# Patient Record
Sex: MALE | Race: WHITE | Employment: FULL TIME | URBAN - METROPOLITAN AREA
[De-identification: names, ages, dates, MRNs, and addresses within clinical notes are randomized per-mention and may not be internally consistent; named-entity substitution may affect disease eponyms.]

---

## 2022-10-25 ENCOUNTER — HOSPITAL ENCOUNTER (EMERGENCY)
Age: 57
Discharge: HOME | End: 2022-10-25
Attending: EMERGENCY MEDICINE
Payer: COMMERCIAL

## 2022-10-25 ENCOUNTER — HOSPITAL ENCOUNTER (EMERGENCY)
Dept: CT IMAGING | Age: 57
Discharge: HOME | End: 2022-10-28
Payer: COMMERCIAL

## 2022-10-25 VITALS
SYSTOLIC BLOOD PRESSURE: 116 MMHG | HEART RATE: 61 BPM | BODY MASS INDEX: 37.89 KG/M2 | OXYGEN SATURATION: 97 % | RESPIRATION RATE: 16 BRPM | DIASTOLIC BLOOD PRESSURE: 73 MMHG | TEMPERATURE: 97.7 F | WEIGHT: 250 LBS | HEIGHT: 68 IN

## 2022-10-25 DIAGNOSIS — N28.1 RENAL CYST: Primary | ICD-10-CM

## 2022-10-25 PROCEDURE — 74176 CT ABD & PELVIS W/O CONTRAST: CPT

## 2022-10-25 PROCEDURE — 99284 EMERGENCY DEPT VISIT MOD MDM: CPT

## 2022-10-25 RX ORDER — ASPIRIN 81 MG/1
81 TABLET ORAL DAILY
COMMUNITY

## 2022-10-25 RX ORDER — LISINOPRIL AND HYDROCHLOROTHIAZIDE 12.5; 1 MG/1; MG/1
1 TABLET ORAL DAILY
COMMUNITY

## 2022-10-25 RX ORDER — TRAMADOL HYDROCHLORIDE 50 MG/1
50 TABLET ORAL EVERY 6 HOURS PRN
Qty: 12 TABLET | Refills: 0 | Status: SHIPPED | OUTPATIENT
Start: 2022-10-25 | End: 2022-10-28

## 2022-10-25 RX ORDER — ATORVASTATIN CALCIUM 20 MG/1
20 TABLET, FILM COATED ORAL DAILY
COMMUNITY

## 2022-10-25 ASSESSMENT — PAIN - FUNCTIONAL ASSESSMENT: PAIN_FUNCTIONAL_ASSESSMENT: 0-10

## 2022-10-25 ASSESSMENT — ENCOUNTER SYMPTOMS
ABDOMINAL PAIN: 1
BLOOD IN STOOL: 0
VOMITING: 0
NAUSEA: 0
ANAL BLEEDING: 0
CONSTIPATION: 0
DIARRHEA: 0

## 2022-10-25 ASSESSMENT — PAIN SCALES - GENERAL: PAINLEVEL_OUTOF10: 9

## 2022-10-25 ASSESSMENT — PAIN DESCRIPTION - ORIENTATION: ORIENTATION: LEFT;LOWER

## 2022-10-25 ASSESSMENT — PAIN DESCRIPTION - LOCATION: LOCATION: FLANK;ABDOMEN

## 2022-10-25 NOTE — ED TRIAGE NOTES
Pt c/o intermittent left flank pain that started three weeks ago, testicle pain started last week and LLQ pain that started today. Pt states he was nausea yesterday. Pt denies vomiting. Pt denies history of kidney stones.  Pt states he went to urgent care and was told to come to the ED for CT scan

## 2022-10-25 NOTE — Clinical Note
Melissa Stewart was seen and treated in our emergency department on 10/25/2022. He may return to work on 10/27/2022. If you have any questions or concerns, please don't hesitate to call.       Patricia Butt, DO

## 2022-10-25 NOTE — Clinical Note
Maureen Valdovinos was seen and treated in our emergency department on 10/25/2022. He may return to work on 10/27/2022. If you have any questions or concerns, please don't hesitate to call.       Kate Campbell, DO

## 2022-10-26 NOTE — CARE COORDINATION
Pt called in asking for a referral to [facility], for he has a appointment scheduled. Referral sent in [facility]. No further needs at this time.

## 2022-10-26 NOTE — ED NOTES
I have reviewed discharge instructions with the patient. The patient verbalized understanding. Patient left ED via Discharge Method: ambulatory to Home with spouse. Opportunity for questions and clarification provided. Patient given 1 scripts. To continue your aftercare when you leave the hospital, you may receive an automated call from our care team to check in on how you are doing. This is a free service and part of our promise to provide the best care and service to meet your aftercare needs.  If you have questions, or wish to unsubscribe from this service please call [phone]. Thank you for Choosing our [address] [place] [address] Emergency Department.         Yoav Rosa RN  10/25/22 3870

## 2022-10-26 NOTE — ED PROVIDER NOTES
Emergency Department Provider Note                   PCP:                Luh Woodard MD               Age: 65 y.o. Sex: male     No diagnosis found. DISPOSITION          MDM  Number of Diagnoses or Management Options  Diagnosis management comments: CT findings are negative for renal stones however there is a large left renal cyst as well as some prominent adenopathy per radiology interpretation. Abnormalities of the bilateral adrenal glands also noted. Patient will be referred to urology for follow-up. Amount and/or Complexity of Data Reviewed  Tests in the radiology section of CPT®: ordered and reviewed  Independent visualization of images, tracings, or specimens: yes    Risk of Complications, Morbidity, and/or Mortality  Presenting problems: low  Diagnostic procedures: low  Management options: low    Patient Progress  Patient progress: stable             Orders Placed This Encounter   Procedures    CT ABDOMEN PELVIS RENAL STONE        Medications - No data to display    New Prescriptions    No medications on file        Joyce Massey is a 65 y.o. male who presents to the Emergency Department with chief complaint of    Chief Complaint   Patient presents with    Flank Pain    Testicle Pain      Patient referred to ER from urgent care for CT to evaluate for possible renal colic. Lab test results (CBC, U/A) done at urgent care reviewed and are unremarkable. Patient reports pain in left flank starting about 3 weeks ago that has progressed to left testicle and LLQ abdominal pain. No hematuria, dysuria, nausea/vomiting reported. Currently pain is 3/10 after receiving analgesics at urgent care. Denies swelling of testicle or mass. The history is provided by the patient. Review of Systems   Constitutional:  Negative for chills and fever. Gastrointestinal:  Positive for abdominal pain. Negative for anal bleeding, blood in stool, constipation, diarrhea, nausea and vomiting. Genitourinary:  Positive for testicular pain. Negative for dysuria, hematuria and urgency. Musculoskeletal:  Negative for myalgias. Neurological:  Negative for numbness. All other systems reviewed and are negative. Past Medical History:   Diagnosis Date    Hyperlipidemia     Hypertension         History reviewed. No pertinent surgical history. History reviewed. No pertinent family history. Social History     Socioeconomic History    Marital status: Single     Spouse name: None    Number of children: None    Years of education: None    Highest education level: None   Tobacco Use    Smoking status: Never    Smokeless tobacco: Never   Substance and Sexual Activity    Alcohol use: Not Currently    Drug use: Not Currently         Patient has no known allergies. Previous Medications    ASPIRIN 81 MG EC TABLET    Take 81 mg by mouth daily    ATORVASTATIN (LIPITOR) 20 MG TABLET    Take 20 mg by mouth daily    LISINOPRIL-HYDROCHLOROTHIAZIDE (PRINZIDE;ZESTORETIC) 10-12.5 MG PER TABLET    Take 1 tablet by mouth daily        Vitals signs and nursing note reviewed. Patient Vitals for the past 4 hrs:   Temp Pulse Resp BP SpO2   10/25/22 1836 -- -- -- 116/73 --   10/25/22 1834 97.7 °F (36.5 °C) 61 16 (!) 179/136 97 %          Physical Exam  Vitals and nursing note reviewed. Constitutional:       General: He is not in acute distress. Appearance: Normal appearance. He is not ill-appearing, toxic-appearing or diaphoretic. HENT:      Head: Normocephalic and atraumatic. Eyes:      Extraocular Movements: Extraocular movements intact. Conjunctiva/sclera: Conjunctivae normal.      Pupils: Pupils are equal, round, and reactive to light. Cardiovascular:      Rate and Rhythm: Normal rate. Pulmonary:      Effort: Pulmonary effort is normal.   Abdominal:      General: There is no distension. Palpations: Abdomen is soft. Tenderness: There is no abdominal tenderness.  There is no right CVA tenderness, left CVA tenderness or guarding. Genitourinary:     Comments: Deferred (done at urgent care)  Musculoskeletal:         General: Normal range of motion. Cervical back: Normal range of motion. Skin:     General: Skin is warm. Capillary Refill: Capillary refill takes less than 2 seconds. Neurological:      General: No focal deficit present. Mental Status: He is alert and oriented to person, place, and time. Mental status is at baseline. Psychiatric:         Mood and Affect: Mood normal.         Behavior: Behavior normal.         Thought Content: Thought content normal.        Procedures    Results for orders placed or performed during the hospital encounter of 10/25/22   CT ABDOMEN PELVIS RENAL STONE    Narrative    EXAM: CT ABDOMEN PELVIS RENAL STONE    HISTORY: Intermittent left flank pain, testicular pain, left lower quadrant  pain. TECHNIQUE: Axial images of the abdomen and pelvis were performed without  intravenous contrast. Images were obtained in the axial plane and coronal  reformatted images were created and reviewed. Dose reduction technique used: Automated exposure control/Adjustment of the mA  and/or kV according to patient size/Use of iterative reconstruction technique. COMPARISON: None. FINDINGS:   Visualized lung bases and mediastinum are unremarkable. The visualized liver, spleen, pancreas and gallbladder, are within normal  limits. The right adrenal gland contains an 8.1 mm fat density lesion. The left adrenal  gland contains a similar fat density lesion which measures 3.4 x 3.1 cm. The  left adrenal gland also contains a focal calcification. The right kidney is unremarkable. The bladder is partially distended and appears  grossly normal.    The left kidney contains a 6.5 x 6.5 cm, exophytic, fluid density lesion in the  lower pole. There is no renal or ureterolithiasis and there is no  hydronephrosis.     Distal esophagus and stomach are unremarkable. Small and large bowel show no  evidence of obstruction. Low-density fluid is noted throughout the small bowel. Appendix is not visualized. There is no evidence of localized inflammatory  process at the level of the cecum. Multiple prominent mesenteric lymph nodes are noted particularly in the left  abdomen. No evidence of free fluid or free air. No abdominal aortic aneurysm. Osseous structures show no evidence of acute fracture or suspicious lesion. Impression    1. No renal ureteral or bladder lithiasis. There is no hydronephrosis. There is  a 6.5 cm exophytic lesion present in the lower pole of the left kidney. Its  Hounsfield units are compatible with a cyst.    2. Both adrenal glands contain fat density lesions as described above. 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 3. The left adrenal gland contains a coarse calcification suggesting prior  hemorrhage. 4. There are multiple prominent mesenteric lymph nodes appreciated particularly  in the left abdomen. The significance is unclear. Short follow-up of these nodes  is recommended to ensure stability or resolution, and to rule out a developing  neoplastic process. CT ABDOMEN PELVIS RENAL STONE   Final Result   1. No renal ureteral or bladder lithiasis. There is no hydronephrosis. There is   a 6.5 cm exophytic lesion present in the lower pole of the left kidney. Its   Hounsfield units are compatible with a cyst.      2. Both adrenal glands contain fat density lesions as described above. 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 3. The left adrenal gland contains a coarse calcification suggesting prior   hemorrhage. 4. There are multiple prominent mesenteric lymph nodes appreciated particularly   in the left abdomen. The significance is unclear. Short follow-up of these nodes   is recommended to ensure stability or resolution, and to rule out a developing   neoplastic process.                           Voice dictation software was used during the making of this note. This software is not perfect and grammatical and other typographical errors may be present. This note has not been completely proofread for errors.      Tracey Enriqeuz, DO  10/25/22 2100       Tracey Enriquez, DO  10/25/22 4519

## 2022-11-01 NOTE — PROGRESS NOTES
[facility]  [address] [place] [address]  [phone]          Donald Murguia  : 1965      INITIAL EVALUATION    Chief Complaint   Patient presents with    New Patient       HPI: Donald Murguia is a 65 y.o. male with renal mass. Patient is a pleasant 71-year-old male who currently lives in [place].  He presents today with a left renal mass. He presented to [facility] emergency department on 10/25/2022 with left-sided lower back and flank pain. He underwent a CT of the abdomen pelvis without contrast which showed a 6.5 cm exophytic lesion on the lower portion of the left kidney. It was compatible with a simple cyst.  He also had bilateral adrenal myelo lipomas. The left one measured 3.4 cm. He was also found to have prominent mesenteric lymphadenopathy. His creatinine on 2021 was 1.17. Of note he saw a [facility] urologist back in  and was diagnosed with a complex left renal cyst that measured approximately 5 cm. It was thought to be a Bosniak 1 or Bosniak 2 cyst and no additional treatment was needed. He also had significant mesenteric adenopathy as well as an adrenal mass measuring 3 cm on the left side that was consistent with a myelo lipoma. He underwent a biopsy of his mesenteric lymphadenopathy in 2019 that was negative for lymphoma or any type of malignancy. The patient is a bit unclear on his past medical history and follow-ups. He denies any gross hematuria. He is currently not having any flank or abdominal pain. PMH:     Past Medical History:   Diagnosis Date    Hyperlipidemia     Hypertension        PSH:    History reviewed. No pertinent surgical history.     MEDs:    Current Outpatient Medications   Medication Sig Dispense Refill    docusate sodium (COLACE) 50 MG capsule Take by mouth      fluticasone (FLONASE) 50 MCG/ACT nasal spray 1 spray by Nasal route daily      guaiFENesin (MUCINEX) 600 MG extended release tablet Take by mouth      loratadine (CLARITIN) 10 MG tablet Take by mouth      naproxen (NAPROSYN) 500 MG tablet Take 500 mg by mouth in the morning and 500 mg in the evening. traMADol (ULTRAM) 50 MG tablet Take 50 mg by mouth every 6 hours as needed. nitroGLYCERIN (NITROSTAT) 0.4 MG SL tablet Place 0.4 mg under the tongue every 5 minutes as needed      aspirin 81 MG EC tablet Take 81 mg by mouth daily      atorvastatin (LIPITOR) 20 MG tablet Take 20 mg by mouth daily      lisinopril-hydroCHLOROthiazide (PRINZIDE;ZESTORETIC) 10-12.5 MG per tablet Take 1 tablet by mouth daily       No current facility-administered medications for this visit. ALLERGIES:     No Known Allergies    FH:     History reviewed. No pertinent family history. SH:     Social History     Socioeconomic History    Marital status: Single     Spouse name: Not on file    Number of children: Not on file    Years of education: Not on file    Highest education level: Not on file   Occupational History    Not on file   Tobacco Use    Smoking status: Never    Smokeless tobacco: Never   Substance and Sexual Activity    Alcohol use: Not Currently    Drug use: Not Currently    Sexual activity: Not on file   Other Topics Concern    Not on file   Social History Narrative    Not on file     Social Determinants of Health     Financial Resource Strain: Not on file   Food Insecurity: Not on file   Transportation Needs: Not on file   Physical Activity: Not on file   Stress: Not on file   Social Connections: Not on file   Intimate Partner Violence: Not on file   Housing Stability: Not on file       ROS:   Review of Systems   Constitutional: Negative. Negative for chills, fatigue and fever. Respiratory: Negative. Cardiovascular: Negative. Gastrointestinal: Negative. Genitourinary: Negative. Negative for difficulty urinating, dysuria, flank pain, frequency, hematuria and urgency.    Musculoskeletal: Negative. All other systems reviewed and are negative. PHYSICAL EXAM  GENERAL: Well-groomed, well-nourished, pleasant 65 y.o. male, in no acute distress. /69 (Site: Right Upper Arm, Position: Sitting, Cuff Size: Medium Adult)   Pulse 53   Temp 97.8 °F (36.6 °C) (Oral)   Resp 16   Ht 5' 8.11\" (1.73 m)   Wt 212 lb 12.8 oz (96.5 kg)   SpO2 99%   BMI 32.25 kg/m²   General: well dressed, well nourished, no acute distress  Skin: no rashes  HEENT: Sclera are clear,normocephalic, atraumatic. no external lesions  Cardiovascular: Reg. Normal perfusion  Respiratory: normal respiratory effort, no JVD, no audible wheezing. Musculoskeletal: unremarkable with normal function. No embolic signs or cyanosis. Neurologic exam: intact, no focal deficits, moves all 4 extremities  Psych: normal mood and affect, alert, oriented x 3  LE:  no edema  GI: soft, nontender, no masses, no CVA tenderness  Lymphatic: no axillary, inguinal, cervical or supraclavicular adenopathy  GENITOURINARY: He has no flank tenderness or palpable masses. Labs:        Recent Labs     11/07/22  1157      K 5.4*      CO2 23   BUN 26*   CREATININE 0.80         Imaging/Radiology:      CT Results (maximum last 3):  === 10/25/22 ===    CT ABDOMEN PELVIS RENAL STONE    - Narrative -  EXAM: CT ABDOMEN PELVIS RENAL STONE    HISTORY: Intermittent left flank pain, testicular pain, left lower quadrant  pain. TECHNIQUE: Axial images of the abdomen and pelvis were performed without  intravenous contrast. Images were obtained in the axial plane and coronal  reformatted images were created and reviewed. Dose reduction technique used: Automated exposure control/Adjustment of the mA  and/or kV according to patient size/Use of iterative reconstruction technique. COMPARISON: None. FINDINGS:  Visualized lung bases and mediastinum are unremarkable.     The visualized liver, spleen, pancreas and gallbladder, are within normal  limits. The right adrenal gland contains an 8.1 mm fat density lesion. The left adrenal  gland contains a similar fat density lesion which measures 3.4 x 3.1 cm. The  left adrenal gland also contains a focal calcification. The right kidney is unremarkable. The bladder is partially distended and appears  grossly normal.    The left kidney contains a 6.5 x 6.5 cm, exophytic, fluid density lesion in the  lower pole. There is no renal or ureterolithiasis and there is no  hydronephrosis. Distal esophagus and stomach are unremarkable. Small and large bowel show no  evidence of obstruction. Low-density fluid is noted throughout the small bowel. Appendix is not visualized. There is no evidence of localized inflammatory  process at the level of the cecum. Multiple prominent mesenteric lymph nodes are noted particularly in the left  abdomen. No evidence of free fluid or free air. No abdominal aortic aneurysm. Osseous structures show no evidence of acute fracture or suspicious lesion.    - Impression -  1. No renal ureteral or bladder lithiasis. There is no hydronephrosis. There is  a 6.5 cm exophytic lesion present in the lower pole of the left kidney. Its  Hounsfield units are compatible with a cyst.    2. Both adrenal glands contain fat density lesions as described above. 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 3. The left adrenal gland contains a coarse calcification suggesting prior  hemorrhage. 4. There are multiple prominent mesenteric lymph nodes appreciated particularly  in the left abdomen. The significance is unclear. Short follow-up of these nodes  is recommended to ensure stability or resolution, and to rule out a developing  neoplastic process. ASSESSMENT: Ilan Mejia is a 65 y.o. male with what appears to be a left renal cyst measuring approximately 6.5 cm. Also see a benign appearing left adrenal myelolipoma measuring just over 3 cm.   I reviewed his CT scans and although this lesion does not look worse even to me and his kidney, I do think we need to fully characterize it with a renal mass protocol CT scan. His creatinine is normal today at 0.8. I like to see him back in 3 to 4 months with a CT scan of the abdomen with and without contrast and delayed images per renal mass protocol. If this is a benign appearing cyst he will continue to follow-up with his primary care doctor. ICD-10-CM    1. Renal mass  P33.18 Basic Metabolic Panel     CT ABDOMEN PELVIS W WO CONTRAST Additional Contrast? Radiologist Recommendation     Basic Metabolic Panel            PLAN:   -review imaging  -bmp today  -kidney stone diet info included in AVS  -ct a/p w wo cont in about 4 months, OV to review   ________________________________________      I have seen and examined this patient. I have reviewed and edited the note started by the MA and agree with the outlined plan. Part of this note was written by using a voice dictation software. The note has been proof read but may still contain some grammatical/other typographical errors.       [facility]

## 2022-11-04 NOTE — PROGRESS NOTES
New Patient Abstract (Uro/Onc)     MRN: [id number]     PATIENT'S NAME: Enid Ovalles     LAST SEEN UROLOGIST: Dr. Ruy Coleman     PCP: Joni Ahumada, MD     CHIEF COMPLAINT: Renal cyst     PMH:  Prediabetes, osteoarthritis, COVID-19, colon polyps, reactive airway disease, HTN, HLD, NICOLAS (cannot tolerate CPAP), mesenteric lymphadenopathy (benign on biopsy),      FAMILY HX: Family history is significant for mother with colon cancer. NARRATIVE WITH RECENT WITH RESULTS/PROCEDURES/BIOPSIES AND DATES COMPLETED: Mr. Suzy Patel is a 60-year-old male who presented to urgent care on 10/25/22 with complaints of acute left-sided low back pain, LLQ abdominal pain, left testicular pain, and left groin pain. Labs drawn at urgent care were unremarkable except for elevated white count of 11.8, abs monocytes 1.04, and ANC 8.54. UA was negative for infection. Work-up at urgent care was unrevealing and patient was sent to the [facility] ED for further evaluation with CT to evaluate for possible renal colic. 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 Patient was given Tramadol for pain and discharged with urology referral for follow-up.      Review of records indicates that patient was seen by Urologist, Dr. Ruy Coleman, back in November and December of 2018 for a complex renal cyst located in his left kidney that was discovered in the context of a scrotal ultrasound for what ended up being a groin strain. CT of the abdomen with and without contrast on 12/1/18 had shown a cyst in the lower pole of the right kidney measuring approximately 5 cm which appeared to represent a simple cyst (Bosniak 1 cyst) on CT.  radiologist noted, based on the prior ultrasound, this probably represents a bosniak type 2 cyst.  no further evaluation is indicated. 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 Patient was referred to Surgical Oncology for further evaluation of the mesenteric lymphadenopathy. He underwent robotic SB mesentery u/s guided lymph node bx on 1/9/19 that was negative for lymphoma or malignancy. PROCEDURES/PATHOLOGY:      LABS:            URINALYSIS 10/25/22        URINE MICROSCOPE POC 10/25/22      IMAGING:     US SCROTUM AND TESTICLES W DOPPLER 8/27/18  FINDINGS:   Right Testicle 3.5 x 2.7 x 2.4 cm, with normal Doppler and no mass. Epididymis normal.  No hydrocele or varicocele   Left Testicle 3.8 x 2.6 x 2.3 cm with normal Doppler and no mass. Epididymis normal.  No hydrocele or varicose   Incidental 5.3 x 4.6 x 4.9 cm septated cyst lower pole left kidney without mass   IMPRESSION: Cause for left testicle pain 5 days not determined. No torsion or epididymal orchitis Incidental septated left renal cyst warrants urologic followup, not urgent      CT ABDOMEN W WO CONTRAST 12/1/18  FINDINGS:   LOWER CHEST: No abnormality is identified   ABDOMEN   Liver: The liver is normal in size and attenuation. GB/Biliary: The gallbladder is normal appearance. I see no evidence of dilated ducts. Portal vein: Normal   Spleen: Normal   Pancreas: Normal   Aorta/Vascular: The abdominal aorta is normal in caliber. Adrenals:  The right adrenal gland is normal.  The patient has findings consistent with that of an approximate 3 cm fat containing left adrenal mass, findings consistent with that of a benign myelolipoma. Kidneys: The right kidney is normal in appearance. The patient has an approximate 5 cm cyst in the lower pole of the left kidney. On CT, this appears to represent a simple cyst.  I unable to appreciate any definite septations, I certainly do not see any cyst area of abnormal enhancement. Note is made of a small low attenuation lesion in the mid left kidney, findings which probably related to a small cyst.   Stomach/Bowel: No bowel abnormality is identified.s   Peritoneum/Mesentery: I believe the patient does have mild mesenteric lymphadenopathy. Not only are there too many normal size lymph nodes identified, a number of the lymph nodes are mildly enlarged. There are lymph nodes that measure between 10 and 15 mm in short axis diameter. I do not see any additional abnormality on this exam to suggest the possible etiology of the lymphadenopathy. No definite bowel abnormality is identified. I cannot make a diagnosis o mesenteric panniculitis. The findings could be seen with a number entities including inflammatory, infectious, or neoplastic process sees. The most likely neoplastic process would be that of lymphoma. Retroperitoneum: I do not see any large retroperitoneal lymph nodes. Abdominal wall: No abnormality is identified. BONY SKLETON : No unexplained bony abnormality is identified. IMPRESSION:   1.  THE PATIENT HAS AN APPROXIMATELY 5 CM CYST IN THE LOWER POLE OF THE RIGHT KIDNEY. THIS APPEARS TO REPRESENT A SIMPLE CYST ON CT (BOSNIAK 1 CYST). BASED ON THE PRIOR ULTRASOUND, THIS PROBABLY REPRESENTS A BOSNIAK TYPE 2 CYST.  NO FURTHER EVALUATION IS INDICATED. 2.  THE PATIENT HAS MILD MESENTERIC LYMPHADENOPATHY. WHILE ABNORMAL, THIS IS NONSPECIFIC. THERE ARE MULTIPLE CAUSES OF LYMPHADENOPATHY INCLUDING INFLAMMATORY, INFECTIOUS AND NEOPLASTIC CAUSES INCLUDING LYMPHOMA.   CLINICAL AND LABORATORY CORRELATION IS RECOMMENDED. AT THE VERY LEAST, A FOLLOW-UP CT SCAN IN 6 MONTHS IS RECOMMENDED FOR FURTHER EVALUATION   3. THE PATIENT HAS AN APPROXIMATELY 3 CM FAT CONTAINING LEFT ADRENAL MASS. THE FINDINGS ARE MOST LIKELY SECONDARY TO A BENIGN MYELOLIPOMA     CTA DISSECTION W/CHEST ABDOMEN 12/15/19  FINDINGS:  Thoracoabdominal aorta is normal caliber with normal enhancement with no evidence of dissection or intramural hematoma. No significant atherosclerotic plaque disease seen. Origins of thoracic great vessels, celiac, SMA, renal arteries, and LEO are patent. No evidence of mediastinal or retroperitoneal hemorrhage. Thoracic vertebral height and alignment appear normal.  No evidence of acute compression fracture. Fairly extensive anterior longitudinal ligamentous ossification of the thoracic spine is noted. Multilevel mild degenerative disc changes and degenerative facet changes Visualized superior aspects of SI joints demonstrate bony bridging anteriorly. Lungs and pleura: no active disease. Mediastinum:  No adenopathy the heart is within normal size limits. No obvious coronary calcification. Adrenals:  Fatty mass left adrenal unchanged compatible with a myelolipoma. Right adrenal appears normal.   Arterial phase enhanced liver, spleen, and pancreas are not remarkable. Gallbladder is not distended. Appears grossly normal.  No dilatation of biliary ducts. Renal lesions compatible with cysts appear unchanged. No suspicious renal lesions. Pelvis is not included on this exam.  IMPRESSION:  NO EVIDENCE OF THORACOABDOMINAL AORTIC ANEURYSM, DISSECTION, OR HEMORRHAGE. CT ABDOMEN PELVIS RENAL STONE 10/25/22  FINDINGS: Visualized lung bases and mediastinum are unremarkable. The visualized liver, spleen, pancreas and gallbladder, are within normal limits. The right adrenal gland contains an 8.1 mm fat density lesion. The left adrenal gland contains a similar fat density lesion which measures 3.4 x 3.1 cm.  The left adrenal gland also contains a focal calcification. The right kidney is unremarkable. The bladder is partially distended and appears  grossly normal. The left kidney contains a 6.5 x 6.5 cm, exophytic, fluid density lesion in the lower pole. There is no renal or ureterolithiasis and there is no hydronephrosis. Distal esophagus and stomach are unremarkable. Small and large bowel show no evidence of obstruction. Low-density fluid is noted throughout the small bowel. Appendix is not visualized. There is no evidence of localized inflammatory process at the level of the cecum. Multiple prominent mesenteric lymph nodes are noted particularly in the left abdomen. No evidence of free fluid or free air. No abdominal aortic aneurysm. Osseous structures show no evidence of acute fracture or suspicious lesion. IMPRESSION:  1. No renal ureteral or bladder lithiasis. There is no hydronephrosis. There is a 6.5 cm exophytic lesion present in the lower pole of the left kidney. Its Hounsfield units are compatible with a cyst.   2. Both adrenal glands contain fat density lesions as described above. 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 3. The left adrenal gland contains a coarse calcification suggesting prior hemorrhage. 4. There are multiple prominent mesenteric lymph nodes appreciated particularly in the left abdomen. The significance is unclear. Short follow-up of these nodes is recommended to ensure stability or resolution, and to rule out a developing neoplastic process.

## 2022-11-07 ENCOUNTER — HOSPITAL ENCOUNTER (OUTPATIENT)
Dept: LAB | Age: 57
Discharge: HOME | End: 2022-11-10
Payer: COMMERCIAL

## 2022-11-07 ENCOUNTER — OFFICE VISIT (OUTPATIENT)
Dept: ONCOLOGY | Age: 57
End: 2022-11-07
Payer: COMMERCIAL

## 2022-11-07 VITALS
OXYGEN SATURATION: 99 % | SYSTOLIC BLOOD PRESSURE: 134 MMHG | WEIGHT: 212.8 LBS | HEART RATE: 53 BPM | BODY MASS INDEX: 32.25 KG/M2 | HEIGHT: 68 IN | RESPIRATION RATE: 16 BRPM | DIASTOLIC BLOOD PRESSURE: 69 MMHG | TEMPERATURE: 97.8 F

## 2022-11-07 DIAGNOSIS — N28.89 RENAL MASS: Primary | ICD-10-CM

## 2022-11-07 LAB
ANION GAP SERPL CALC-SCNC: 5 MMOL/L (ref 2–11)
BUN SERPL-MCNC: 26 MG/DL (ref 6–23)
CALCIUM SERPL-MCNC: 8.4 MG/DL (ref 8.3–10.4)
CHLORIDE SERPL-SCNC: 110 MMOL/L (ref 101–110)
CO2 SERPL-SCNC: 23 MMOL/L (ref 21–32)
CREAT SERPL-MCNC: 0.8 MG/DL (ref 0.8–1.5)
GLUCOSE SERPL-MCNC: 101 MG/DL (ref 65–100)
POTASSIUM SERPL-SCNC: 5.4 MMOL/L (ref 3.5–5.1)
SODIUM SERPL-SCNC: 138 MMOL/L (ref 133–143)

## 2022-11-07 PROCEDURE — 99204 OFFICE O/P NEW MOD 45 MIN: CPT | Performed by: UROLOGY

## 2022-11-07 PROCEDURE — 80048 BASIC METABOLIC PNL TOTAL CA: CPT

## 2022-11-07 PROCEDURE — 36415 COLL VENOUS BLD VENIPUNCTURE: CPT

## 2022-11-07 RX ORDER — TRAMADOL HYDROCHLORIDE 50 MG/1
50 TABLET ORAL EVERY 6 HOURS PRN
COMMUNITY
Start: 2022-10-28

## 2022-11-07 RX ORDER — NITROGLYCERIN 0.4 MG/1
0.4 TABLET SUBLINGUAL EVERY 5 MIN PRN
COMMUNITY

## 2022-11-07 RX ORDER — GUAIFENESIN 600 MG/1
TABLET, EXTENDED RELEASE ORAL
COMMUNITY

## 2022-11-07 RX ORDER — FLUTICASONE PROPIONATE 50 MCG
1 SPRAY, SUSPENSION (ML) NASAL DAILY
COMMUNITY
Start: 2022-04-26 | End: 2023-04-26

## 2022-11-07 RX ORDER — LORATADINE 10 MG/1
TABLET ORAL
COMMUNITY

## 2022-11-07 RX ORDER — NAPROXEN 500 MG/1
500 TABLET ORAL EVERY 12 HOURS
COMMUNITY
Start: 2022-10-28

## 2022-11-07 ASSESSMENT — PATIENT HEALTH QUESTIONNAIRE - PHQ9
SUM OF ALL RESPONSES TO PHQ9 QUESTIONS 1 & 2: 0
SUM OF ALL RESPONSES TO PHQ QUESTIONS 1-9: 0
1. LITTLE INTEREST OR PLEASURE IN DOING THINGS: 0
2. FEELING DOWN, DEPRESSED OR HOPELESS: 0
SUM OF ALL RESPONSES TO PHQ QUESTIONS 1-9: 0

## 2022-11-07 ASSESSMENT — ENCOUNTER SYMPTOMS
GASTROINTESTINAL NEGATIVE: 1
RESPIRATORY NEGATIVE: 1

## 2022-11-07 NOTE — PATIENT INSTRUCTIONS
Patient Instructions from Today's Visit    Reason for Visit:  New patient    Diagnosis Information:  [email or web address]      Plan: We would like to check lab today  We would like to see you back in about 4 months with a scan. As long as this is stable you will be fine to move to an as needed basis     Follow Up: In 4 months     Recent Lab Results:  N/a    Treatment Summary has been discussed and given to patient:   N/a      -------------------------------------------------------------------------------------------------------------------    Patient does express an interest in My Chart. My Chart log in information explained on the after visit summary printout at the [facility] [employer] desk. Manuelito Sullivan NCMA      Kidney Stone Diet Recommendations     Drink plenty of fluid: 2-3 quarts/day  This includes any type of fluid such as water, coffee and lemonade which have been shown to have a beneficial effect with the exception of grapefruit juice and soda. This will help produce less concentrated urine and ensure a good urine volume of at least 2.5L/day  Limit foods with high oxalate content  Spinach, many berries, chocolate, wheat bran, nuts, beets, tea and rhubarb should be eliminated from your diet intake  Eat enough dietary calcium  Three servings of dairy per day will help lower the risk of calcium stone formation. Eat with meals. Avoid extra calcium supplements  Calcium supplements should be individualized by your physician and registered kidney dietitian  Eat a moderate amount of protein  High protein intakes will cause the kidneys to excrete more calcium therefore this may cause more stones to form in the kidney  Avoid high salt intake  High sodium intake increases calcium in the urine which increases the chances of developing stones  Low salt diet is also important to control blood pressure.   Avoid high doses of vitamin C supplements  It is recommend to take 60mg/day of vitamin C based on the [place] Dietary Reference Intake  Excess amounts of 1000mg/day or more may produce more oxalate in the body  *Information located on www.kidney. org*

## 2023-02-28 ENCOUNTER — TELEPHONE (OUTPATIENT)
Dept: ONCOLOGY | Age: 58
End: 2023-02-28

## 2023-03-08 ENCOUNTER — TELEPHONE (OUTPATIENT)
Dept: ONCOLOGY | Age: 58
End: 2023-03-08